# Patient Record
Sex: MALE | Race: OTHER
[De-identification: names, ages, dates, MRNs, and addresses within clinical notes are randomized per-mention and may not be internally consistent; named-entity substitution may affect disease eponyms.]

---

## 2018-02-16 ENCOUNTER — RECORD ABSTRACTING (OUTPATIENT)
Age: 38
End: 2018-02-16

## 2018-02-16 DIAGNOSIS — Z87.19 PERSONAL HISTORY OF OTHER DISEASES OF THE DIGESTIVE SYSTEM: ICD-10-CM

## 2018-02-16 DIAGNOSIS — D51.9 VITAMIN B12 DEFICIENCY ANEMIA, UNSPECIFIED: ICD-10-CM

## 2018-03-13 ENCOUNTER — APPOINTMENT (OUTPATIENT)
Dept: INTERNAL MEDICINE | Facility: CLINIC | Age: 38
End: 2018-03-13

## 2019-01-31 ENCOUNTER — APPOINTMENT (OUTPATIENT)
Dept: INTERNAL MEDICINE | Facility: CLINIC | Age: 39
End: 2019-01-31
Payer: SELF-PAY

## 2019-01-31 VITALS
DIASTOLIC BLOOD PRESSURE: 67 MMHG | OXYGEN SATURATION: 98 % | SYSTOLIC BLOOD PRESSURE: 121 MMHG | TEMPERATURE: 97.3 F | RESPIRATION RATE: 16 BRPM | HEART RATE: 56 BPM | BODY MASS INDEX: 30.62 KG/M2 | HEIGHT: 63.5 IN | WEIGHT: 175 LBS

## 2019-01-31 DIAGNOSIS — Z78.9 OTHER SPECIFIED HEALTH STATUS: ICD-10-CM

## 2019-01-31 DIAGNOSIS — R10.32 LEFT LOWER QUADRANT PAIN: ICD-10-CM

## 2019-01-31 PROCEDURE — 99214 OFFICE O/P EST MOD 30 MIN: CPT

## 2019-01-31 RX ORDER — ERGOCALCIFEROL 1.25 MG/1
1.25 MG CAPSULE ORAL
Refills: 0 | Status: DISCONTINUED | COMMUNITY
End: 2019-01-31

## 2019-01-31 RX ORDER — DICYCLOMINE HYDROCHLORIDE 20 MG/1
20 TABLET ORAL 3 TIMES DAILY
Qty: 21 | Refills: 0 | Status: ACTIVE | COMMUNITY
Start: 2019-01-31 | End: 1900-01-01

## 2019-01-31 RX ORDER — CYANOCOBALAMIN
1000 KIT
Refills: 0 | Status: DISCONTINUED | COMMUNITY
End: 2019-01-31

## 2019-01-31 RX ORDER — OMEPRAZOLE 40 MG/1
40 CAPSULE, DELAYED RELEASE ORAL
Refills: 0 | Status: DISCONTINUED | COMMUNITY
End: 2019-01-31

## 2019-01-31 RX ORDER — CHLORHEXIDINE GLUCONATE 4 %
1000 LIQUID (ML) TOPICAL DAILY
Refills: 0 | Status: DISCONTINUED | COMMUNITY
End: 2019-01-31

## 2019-01-31 NOTE — HISTORY OF PRESENT ILLNESS
[FreeTextEntry1] : Interview and discussion conducted in [place]an by [place]an speaking Physician.\par abdominal pain  [de-identified] : 38 years old male walks in complaining of left lower quadrant abdominal pain since yesterday, colicky , intermittent, insidious onset, non radiated, denies nausea, no diarrhea, denies fever, feeling thirsty , polyuria , denies dysuria

## 2019-02-04 ENCOUNTER — APPOINTMENT (OUTPATIENT)
Dept: INTERNAL MEDICINE | Facility: CLINIC | Age: 39
End: 2019-02-04
Payer: SELF-PAY

## 2019-02-04 PROCEDURE — 36415 COLL VENOUS BLD VENIPUNCTURE: CPT

## 2019-02-05 LAB
25(OH)D3 SERPL-MCNC: 21.6 NG/ML
ALBUMIN SERPL ELPH-MCNC: 4.1 G/DL
ALP BLD-CCNC: 75 U/L
ALT SERPL-CCNC: 23 U/L
ANION GAP SERPL CALC-SCNC: 11 MMOL/L
APPEARANCE: CLEAR
AST SERPL-CCNC: 18 U/L
BACTERIA: NEGATIVE
BASOPHILS # BLD AUTO: 0.01 K/UL
BASOPHILS NFR BLD AUTO: 0.2 %
BILIRUB SERPL-MCNC: 0.4 MG/DL
BILIRUBIN URINE: NEGATIVE
BLOOD URINE: NEGATIVE
BUN SERPL-MCNC: 15 MG/DL
CALCIUM SERPL-MCNC: 9.3 MG/DL
CHLORIDE SERPL-SCNC: 105 MMOL/L
CHOLEST SERPL-MCNC: 158 MG/DL
CHOLEST/HDLC SERPL: 3.1 RATIO
CO2 SERPL-SCNC: 27 MMOL/L
COLOR: YELLOW
CREAT SERPL-MCNC: 0.84 MG/DL
EOSINOPHIL # BLD AUTO: 0.09 K/UL
EOSINOPHIL NFR BLD AUTO: 1.4 %
GLUCOSE QUALITATIVE U: NEGATIVE MG/DL
GLUCOSE SERPL-MCNC: 85 MG/DL
HBA1C MFR BLD HPLC: 5.2 %
HCT VFR BLD CALC: 44.3 %
HDLC SERPL-MCNC: 51 MG/DL
HGB BLD-MCNC: 14.3 G/DL
IMM GRANULOCYTES NFR BLD AUTO: 0.2 %
KETONES URINE: NEGATIVE
LDLC SERPL CALC-MCNC: 93 MG/DL
LEUKOCYTE ESTERASE URINE: NEGATIVE
LYMPHOCYTES # BLD AUTO: 1.9 K/UL
LYMPHOCYTES NFR BLD AUTO: 29.1 %
MAN DIFF?: NORMAL
MCHC RBC-ENTMCNC: 28 PG
MCHC RBC-ENTMCNC: 32.3 GM/DL
MCV RBC AUTO: 86.9 FL
MICROSCOPIC-UA: NORMAL
MONOCYTES # BLD AUTO: 0.22 K/UL
MONOCYTES NFR BLD AUTO: 3.4 %
NEUTROPHILS # BLD AUTO: 4.31 K/UL
NEUTROPHILS NFR BLD AUTO: 65.7 %
NITRITE URINE: NEGATIVE
PH URINE: 8.5
PLATELET # BLD AUTO: 179 K/UL
POTASSIUM SERPL-SCNC: 4.2 MMOL/L
PROT SERPL-MCNC: 6.4 G/DL
PROTEIN URINE: NEGATIVE MG/DL
RBC # BLD: 5.1 M/UL
RBC # FLD: 12.9 %
RED BLOOD CELLS URINE: 0 /HPF
SODIUM SERPL-SCNC: 143 MMOL/L
SPECIFIC GRAVITY URINE: 1.02
SQUAMOUS EPITHELIAL CELLS: 0 /HPF
T4 FREE SERPL-MCNC: 1.2 NG/DL
TRIGL SERPL-MCNC: 70 MG/DL
TSH SERPL-ACNC: 1.6 UIU/ML
UROBILINOGEN URINE: NEGATIVE MG/DL
VIT B12 SERPL-MCNC: 452 PG/ML
WBC # FLD AUTO: 6.54 K/UL
WHITE BLOOD CELLS URINE: 0 /HPF

## 2019-02-15 ENCOUNTER — APPOINTMENT (OUTPATIENT)
Dept: INTERNAL MEDICINE | Facility: CLINIC | Age: 39
End: 2019-02-15
Payer: SELF-PAY

## 2019-02-15 VITALS
OXYGEN SATURATION: 97 % | HEART RATE: 58 BPM | WEIGHT: 176 LBS | BODY MASS INDEX: 30.8 KG/M2 | TEMPERATURE: 97.5 F | HEIGHT: 63.5 IN | RESPIRATION RATE: 16 BRPM | DIASTOLIC BLOOD PRESSURE: 71 MMHG | SYSTOLIC BLOOD PRESSURE: 118 MMHG

## 2019-02-15 PROCEDURE — 99214 OFFICE O/P EST MOD 30 MIN: CPT

## 2019-02-15 RX ORDER — CHOLECALCIFEROL (VITAMIN D3) 1250 MCG
1.25 MG CAPSULE ORAL
Qty: 12 | Refills: 1 | Status: ACTIVE | COMMUNITY
Start: 2019-02-15 | End: 1900-01-01

## 2019-02-15 NOTE — HISTORY OF PRESENT ILLNESS
[FreeTextEntry1] : lab results\par Interview and discussion conducted in Turkish by Turkish speaking Physician.\par  [de-identified] : patient here today to review and discuss labs results, no acute complains\par

## 2019-02-15 NOTE — PLAN
[FreeTextEntry1] : patient with persistent dyspepsia symptoms, will schedule H Pylori urea breath test for next Wednesday

## 2019-02-21 ENCOUNTER — APPOINTMENT (OUTPATIENT)
Dept: INTERNAL MEDICINE | Facility: CLINIC | Age: 39
End: 2019-02-21
Payer: SELF-PAY

## 2019-02-21 PROCEDURE — 83013 H PYLORI (C-13) BREATH: CPT

## 2019-02-22 LAB — UREA BREATH TEST QL: POSITIVE

## 2019-03-13 ENCOUNTER — APPOINTMENT (OUTPATIENT)
Dept: INTERNAL MEDICINE | Facility: CLINIC | Age: 39
End: 2019-03-13
Payer: SELF-PAY

## 2019-03-13 VITALS
DIASTOLIC BLOOD PRESSURE: 69 MMHG | SYSTOLIC BLOOD PRESSURE: 117 MMHG | RESPIRATION RATE: 16 BRPM | HEART RATE: 56 BPM | WEIGHT: 175 LBS | BODY MASS INDEX: 30.62 KG/M2 | TEMPERATURE: 98.1 F | OXYGEN SATURATION: 96 % | HEIGHT: 63.5 IN

## 2019-03-13 PROCEDURE — 99214 OFFICE O/P EST MOD 30 MIN: CPT

## 2019-03-13 NOTE — HISTORY OF PRESENT ILLNESS
[FreeTextEntry1] : h pylori results \par Interview and discussion conducted in Divehi by [place]ivehi speaking Physician.\par  [de-identified] : patient here today to review and discuss labs results, no acute complains\par

## 2019-04-24 ENCOUNTER — APPOINTMENT (OUTPATIENT)
Dept: INTERNAL MEDICINE | Facility: CLINIC | Age: 39
End: 2019-04-24
Payer: SELF-PAY

## 2019-04-24 PROCEDURE — 83013 H PYLORI (C-13) BREATH: CPT

## 2019-04-26 LAB — UREA BREATH TEST QL: NEGATIVE

## 2019-05-07 ENCOUNTER — APPOINTMENT (OUTPATIENT)
Dept: INTERNAL MEDICINE | Facility: CLINIC | Age: 39
End: 2019-05-07
Payer: SELF-PAY

## 2019-05-07 VITALS
OXYGEN SATURATION: 95 % | HEART RATE: 61 BPM | HEIGHT: 63.5 IN | DIASTOLIC BLOOD PRESSURE: 73 MMHG | RESPIRATION RATE: 17 BRPM | SYSTOLIC BLOOD PRESSURE: 129 MMHG | BODY MASS INDEX: 29.92 KG/M2 | TEMPERATURE: 97.9 F | WEIGHT: 171 LBS

## 2019-05-07 DIAGNOSIS — A04.8 OTHER SPECIFIED BACTERIAL INTESTINAL INFECTIONS: ICD-10-CM

## 2019-05-07 PROCEDURE — 99214 OFFICE O/P EST MOD 30 MIN: CPT

## 2019-05-07 RX ORDER — OMEPRAZOLE 40 MG/1
40 CAPSULE, DELAYED RELEASE ORAL
Qty: 30 | Refills: 2 | Status: ACTIVE | COMMUNITY
Start: 2019-05-07 | End: 1900-01-01

## 2019-05-07 NOTE — PLAN
[FreeTextEntry1] : patient complete treatment for H Pylori infection, resolved, dietary counselling given; follow up in three months

## 2019-05-07 NOTE — HISTORY OF PRESENT ILLNESS
[FreeTextEntry1] : lab results\par Interview and discussion conducted in [place] by [place] speaking Physician.\par  [de-identified] : patient here today to review and discuss labs results, no acute complains\par

## 2019-05-07 NOTE — PHYSICAL EXAM
[No Acute Distress] : no acute distress [Well Nourished] : well nourished [Well-Appearing] : well-appearing [Well Developed] : well developed [Normal Sclera/Conjunctiva] : normal sclera/conjunctiva [EOMI] : extraocular movements intact [PERRL] : pupils equal round and reactive to light [Normal Oropharynx] : the oropharynx was normal [No JVD] : no jugular venous distention [Normal Outer Ear/Nose] : the outer ears and nose were normal in appearance [No Lymphadenopathy] : no lymphadenopathy [Supple] : supple [Thyroid Normal, No Nodules] : the thyroid was normal and there were no nodules present [No Respiratory Distress] : no respiratory distress  [Clear to Auscultation] : lungs were clear to auscultation bilaterally [No Accessory Muscle Use] : no accessory muscle use [Regular Rhythm] : with a regular rhythm [Normal S1, S2] : normal S1 and S2 [Normal Rate] : normal rate  [No Murmur] : no murmur heard [No Carotid Bruits] : no carotid bruits [No Abdominal Bruit] : a ~M bruit was not heard ~T in the abdomen [Pedal Pulses Present] : the pedal pulses are present [No Varicosities] : no varicosities [No Edema] : there was no peripheral edema [Soft] : abdomen soft [No Extremity Clubbing/Cyanosis] : no extremity clubbing/cyanosis [No Palpable Aorta] : no palpable aorta [Non Tender] : non-tender [No Masses] : no abdominal mass palpated [Non-distended] : non-distended [Normal Posterior Cervical Nodes] : no posterior cervical lymphadenopathy [Normal Bowel Sounds] : normal bowel sounds [No HSM] : no HSM [No CVA Tenderness] : no CVA  tenderness [Normal Anterior Cervical Nodes] : no anterior cervical lymphadenopathy [No Spinal Tenderness] : no spinal tenderness [Grossly Normal Strength/Tone] : grossly normal strength/tone [No Rash] : no rash [No Joint Swelling] : no joint swelling [Normal Gait] : normal gait [Coordination Grossly Intact] : coordination grossly intact [Deep Tendon Reflexes (DTR)] : deep tendon reflexes were 2+ and symmetric [Normal Affect] : the affect was normal [No Focal Deficits] : no focal deficits [Normal Insight/Judgement] : insight and judgment were intact

## 2019-08-02 ENCOUNTER — OTHER (OUTPATIENT)
Age: 39
End: 2019-08-02

## 2019-08-19 ENCOUNTER — APPOINTMENT (OUTPATIENT)
Dept: INTERNAL MEDICINE | Facility: CLINIC | Age: 39
End: 2019-08-19
Payer: SELF-PAY

## 2019-08-19 DIAGNOSIS — Z00.00 ENCOUNTER FOR GENERAL ADULT MEDICAL EXAMINATION W/OUT ABNORMAL FINDINGS: ICD-10-CM

## 2019-08-19 PROCEDURE — 36415 COLL VENOUS BLD VENIPUNCTURE: CPT

## 2019-08-20 LAB
25(OH)D3 SERPL-MCNC: 30.9 NG/ML
ALBUMIN SERPL ELPH-MCNC: 4.2 G/DL
ALP BLD-CCNC: 86 U/L
ALT SERPL-CCNC: 13 U/L
ANION GAP SERPL CALC-SCNC: 13 MMOL/L
AST SERPL-CCNC: 15 U/L
BILIRUB SERPL-MCNC: 0.4 MG/DL
BUN SERPL-MCNC: 14 MG/DL
CALCIUM SERPL-MCNC: 9.4 MG/DL
CHLORIDE SERPL-SCNC: 107 MMOL/L
CHOLEST SERPL-MCNC: 152 MG/DL
CHOLEST/HDLC SERPL: 2.8 RATIO
CO2 SERPL-SCNC: 22 MMOL/L
CREAT SERPL-MCNC: 0.91 MG/DL
GLUCOSE SERPL-MCNC: 90 MG/DL
HDLC SERPL-MCNC: 54 MG/DL
LDLC SERPL CALC-MCNC: 87 MG/DL
POTASSIUM SERPL-SCNC: 4.2 MMOL/L
PROT SERPL-MCNC: 6.8 G/DL
SODIUM SERPL-SCNC: 142 MMOL/L
TRIGL SERPL-MCNC: 56 MG/DL

## 2019-08-27 ENCOUNTER — APPOINTMENT (OUTPATIENT)
Dept: INTERNAL MEDICINE | Facility: CLINIC | Age: 39
End: 2019-08-27
Payer: SELF-PAY

## 2019-08-27 VITALS
BODY MASS INDEX: 29.57 KG/M2 | SYSTOLIC BLOOD PRESSURE: 112 MMHG | HEIGHT: 63.5 IN | TEMPERATURE: 98.2 F | OXYGEN SATURATION: 95 % | RESPIRATION RATE: 17 BRPM | WEIGHT: 169 LBS | DIASTOLIC BLOOD PRESSURE: 70 MMHG | HEART RATE: 50 BPM

## 2019-08-27 DIAGNOSIS — E55.9 VITAMIN D DEFICIENCY, UNSPECIFIED: ICD-10-CM

## 2019-08-27 DIAGNOSIS — K30 FUNCTIONAL DYSPEPSIA: ICD-10-CM

## 2019-08-27 PROCEDURE — 99214 OFFICE O/P EST MOD 30 MIN: CPT

## 2019-08-27 RX ORDER — OMEPRAZOLE 20 MG/1
20 CAPSULE, DELAYED RELEASE ORAL TWICE DAILY
Qty: 28 | Refills: 0 | Status: DISCONTINUED | COMMUNITY
Start: 2019-03-13 | End: 2019-08-27

## 2019-08-27 RX ORDER — AMOXICILLIN 500 MG/1
500 CAPSULE ORAL TWICE DAILY
Qty: 56 | Refills: 0 | Status: DISCONTINUED | COMMUNITY
Start: 2019-03-13 | End: 2019-08-27

## 2019-08-27 RX ORDER — CLARITHROMYCIN 500 MG/1
500 TABLET, FILM COATED ORAL TWICE DAILY
Qty: 28 | Refills: 0 | Status: DISCONTINUED | COMMUNITY
Start: 2019-03-13 | End: 2019-08-27

## 2019-08-27 NOTE — PHYSICAL EXAM
[No Acute Distress] : no acute distress [Well Nourished] : well nourished [Well Developed] : well developed [Well-Appearing] : well-appearing [Normal Sclera/Conjunctiva] : normal sclera/conjunctiva [PERRL] : pupils equal round and reactive to light [EOMI] : extraocular movements intact [Normal Outer Ear/Nose] : the outer ears and nose were normal in appearance [No JVD] : no jugular venous distention [Normal Oropharynx] : the oropharynx was normal [No Lymphadenopathy] : no lymphadenopathy [Supple] : supple [Thyroid Normal, No Nodules] : the thyroid was normal and there were no nodules present [No Respiratory Distress] : no respiratory distress  [No Accessory Muscle Use] : no accessory muscle use [Clear to Auscultation] : lungs were clear to auscultation bilaterally [Normal Rate] : normal rate  [Regular Rhythm] : with a regular rhythm [Normal S1, S2] : normal S1 and S2 [No Murmur] : no murmur heard [No Carotid Bruits] : no carotid bruits [No Abdominal Bruit] : a ~M bruit was not heard ~T in the abdomen [Pedal Pulses Present] : the pedal pulses are present [No Varicosities] : no varicosities [No Palpable Aorta] : no palpable aorta [No Edema] : there was no peripheral edema [No Extremity Clubbing/Cyanosis] : no extremity clubbing/cyanosis [Soft] : abdomen soft [Non Tender] : non-tender [No Masses] : no abdominal mass palpated [Non-distended] : non-distended [No HSM] : no HSM [Normal Bowel Sounds] : normal bowel sounds [Normal Posterior Cervical Nodes] : no posterior cervical lymphadenopathy [Normal Anterior Cervical Nodes] : no anterior cervical lymphadenopathy [No Spinal Tenderness] : no spinal tenderness [No CVA Tenderness] : no CVA  tenderness [No Joint Swelling] : no joint swelling [Grossly Normal Strength/Tone] : grossly normal strength/tone [No Rash] : no rash [Coordination Grossly Intact] : coordination grossly intact [No Focal Deficits] : no focal deficits [Normal Gait] : normal gait [Deep Tendon Reflexes (DTR)] : deep tendon reflexes were 2+ and symmetric [Normal Affect] : the affect was normal [Normal Insight/Judgement] : insight and judgment were intact

## 2019-08-27 NOTE — HISTORY OF PRESENT ILLNESS
[de-identified] : patient here today to review and discuss labs results, no acute complains\par  [FreeTextEntry1] : lab results\par Interview and discussion conducted in Fijian by Fijian speaking Physician.\par

## 2019-08-27 NOTE — PLAN
[FreeTextEntry1] : patient clinically stable ; will follow up in six months for routine general labs

## 2020-02-10 ENCOUNTER — APPOINTMENT (OUTPATIENT)
Dept: INTERNAL MEDICINE | Facility: CLINIC | Age: 40
End: 2020-02-10